# Patient Record
Sex: MALE | Race: WHITE | URBAN - METROPOLITAN AREA
[De-identification: names, ages, dates, MRNs, and addresses within clinical notes are randomized per-mention and may not be internally consistent; named-entity substitution may affect disease eponyms.]

---
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
## 2018-06-19 ENCOUNTER — OFFICE VISIT (OUTPATIENT)
Dept: FAMILY MEDICINE | Facility: CLINIC | Age: 18
End: 2018-06-19
Payer: COMMERCIAL

## 2018-06-19 VITALS — SYSTOLIC BLOOD PRESSURE: 124 MMHG | TEMPERATURE: 98.6 F | DIASTOLIC BLOOD PRESSURE: 72 MMHG

## 2018-06-19 DIAGNOSIS — Z71.84 TRAVEL ADVICE ENCOUNTER: Primary | ICD-10-CM

## 2018-06-19 PROCEDURE — 99402 PREV MED CNSL INDIV APPRX 30: CPT | Mod: 25 | Performed by: FAMILY MEDICINE

## 2018-06-19 PROCEDURE — 90691 TYPHOID VACCINE IM: CPT | Performed by: FAMILY MEDICINE

## 2018-06-19 PROCEDURE — 90471 IMMUNIZATION ADMIN: CPT | Performed by: FAMILY MEDICINE

## 2018-06-19 PROCEDURE — 90472 IMMUNIZATION ADMIN EACH ADD: CPT | Performed by: FAMILY MEDICINE

## 2018-06-19 PROCEDURE — 90632 HEPA VACCINE ADULT IM: CPT | Performed by: FAMILY MEDICINE

## 2018-06-19 RX ORDER — AZITHROMYCIN 500 MG/1
TABLET, FILM COATED ORAL
Qty: 3 TABLET | Refills: 0 | Status: SHIPPED | OUTPATIENT
Start: 2018-06-19

## 2018-06-19 NOTE — PROGRESS NOTES
Itinerary:  [place]    Departure Date: 07/24/2018    Return Date: 09/05/2018    Length of Trip: 6 weeks    Purpose of Trip: Training    Urban/Rural: Urban    Accommodations: Dorms    IMMUNIZATION HISTORY  Have you received any immunizations within the past 4 weeks?  No  Have you ever fainted from having your blood drawn or from an injection?  No  Have you ever had a fever reaction to vaccination?  No  Have you ever had any bad reaction or side effect from any vaccination?  No  Have you ever had hepatitis A or B vaccine?  Yes  Do you live (or work closely) with anyone who has AIDS, an AIDS-like condition, any other immune disorder or who is on chemotherapy for cancer or a   family history of immunodeficiency?  No  Have you received any injection of immune globulin or any blood products during the past 12 months?  No    Patient roomed by Ondina Andrade MA    Special medical concerns: none    /72  Temp 98.6  F (37  C) (Oral)  EXAM: deferred    Immunizations discussed include: Hepatitis A and Typhoid  Malaraia prophylaxis recommended: none, will not be in high risk areas, mosquito precautions only    Symptomatic treatment for traveler's diarrhea: azithromycin    ASSESSMENT/PLAN:    ICD-10-CM    1. Travel advice encounter Z71.89 azithromycin (ZITHROMAX) 500 MG tablet     HEPA VACCINE ADULT IM     TYPHOID VACCINE, IM     ADMIN 1st VACCINE     IMMUNIATION ADMIN EACH ADDT'     I have reviewed general recommendations for safe travel   including: food/water precautions, insect avoidance, safe sex   practices given high prevalence of HIV and other STDs,   roadway safety. Educational materials and [employer] report provided.    Total visit time 30 minutes with over 50% of time spent counseling patient.

## 2018-06-19 NOTE — MR AVS SNAPSHOT
After Visit Summary   6/19/2018    Forest Valadez    MRN: [id number]           Patient Information     Date Of Birth          2000        Visit Information        Provider Department      6/19/2018 3:00 PM Robby Joseph MD [facility]        Today's Diagnoses     Travel advice encounter    -  1       Follow-ups after your visit        Who to contact     If you have questions or need follow up information about today's clinic visit or your schedule please contact [facility] directly at [phone].  Normal or non-critical lab and imaging results will be communicated to you by iChangehart, letter or phone within 4 business days after the clinic has received the results. If you do not hear from us within 7 days, please contact the clinic through thereNowt or phone. If you have a critical or abnormal lab result, we will notify you by phone as soon as possible.  Submit refill requests through Opencare or call your pharmacy and they will forward the refill request to us. Please allow 3 business days for your refill to be completed.          Additional Information About Your Visit        MyChart Information     [employer] gives you secure access to your electronic health record. If you see a primary care provider, you can also send messages to your care team and make appointments. If you have questions, please call your primary care clinic.  If you do not have a primary care provider, please call [phone] and they will assist you.        Care EveryWhere ID     This is your Care EveryWhere ID. This could be used by other organizations to access your [place] medical records  [id number]        Your Vitals Were     Temperature                   98.6  F (37  C) (Oral)            Blood Pressure from Last 3 Encounters:   06/19/18 124/72   06/25/15 110/62   07/18/13 104/58    Weight from Last 3 Encounters:   06/25/15 136 lb 6.4 oz (61.9 kg) (40 %)*   07/18/13 127 lb (57.6 kg) (55  %)*     * Growth percentiles are based on [facility] 2-20 Years data.              We Performed the Following     ADMIN 1st VACCINE     HEPA VACCINE ADULT IM     IMMUNIATION ADMIN EACH ADDT'     TYPHOID VACCINE, IM          Today's Medication Changes          These changes are accurate as of 6/19/18 11:59 PM.  If you have any questions, ask your nurse or doctor.               Start taking these medicines.        Dose/Directions    azithromycin 500 MG tablet   Commonly known as:  ZITHROMAX   Used for:  Travel advice encounter   Started by:  Robby Joseph MD        Take one tablet daily for up to 3 days as needed for traveler's diarrhea   Quantity:  3 tablet   Refills:  0            Where to get your medicines      These medications were sent to [id number] [address] [facility] [facility]  [address] [facility] [address]    Hours:  Closed 10-16-08 business to [facility] Phone:  [phone]     azithromycin 500 MG tablet                Primary Care Provider Office Phone # Fax #    [facility] [phone] [address] [place] [address]        Equal Access to Services     [facility] AH: Hadii aad ku hadasho Soomaali, waaxda luqadaha, qaybta kaalmada adeegyada, nicol gao . So [facility] [phone].    ATENCIÓN: Si habla español, tiene a haque disposición servicios gratuitos de asistencia lingüística. Llame al [phone].    We comply with applicable federal civil rights laws and [place] laws. We do not discriminate on the basis of race, color, national origin, age, disability, sex, sexual orientation, or gender identity.            Thank you!     Thank you for choosing [facility]  for your care. Our goal is always to provide you with excellent care. Hearing back from our patients is one way we can continue to improve our services. Please take a few minutes to complete the written survey that you may  receive in the mail after your visit with us. Thank you!             Your Updated Medication List - Protect others around you: Learn how to safely use, store and throw away your medicines at www.disposemymeds.org.          This list is accurate as of 6/19/18 11:59 PM.  Always use your most recent med list.                   Brand Name Dispense Instructions for use Diagnosis    azithromycin 500 MG tablet    ZITHROMAX    3 tablet    Take one tablet daily for up to 3 days as needed for traveler's diarrhea    Travel advice encounter       tretinoin 0.05 % cream    RETIN-A    30 g    Apply once daily at bedtime for acne    Acne

## 2019-09-30 ENCOUNTER — TELEPHONE (OUTPATIENT)
Dept: PSYCHOLOGY | Facility: CLINIC | Age: 19
End: 2019-09-30

## 2020-03-02 ENCOUNTER — HEALTH MAINTENANCE LETTER (OUTPATIENT)
Age: 20
End: 2020-03-02

## 2020-08-05 ENCOUNTER — VIRTUAL VISIT (OUTPATIENT)
Dept: UROLOGY | Facility: CLINIC | Age: 20
End: 2020-08-05
Payer: COMMERCIAL

## 2020-08-05 DIAGNOSIS — I86.1 VARICOCELE: Primary | ICD-10-CM

## 2020-08-05 PROCEDURE — 99201 ZZC OFFICE/OUTPT VISIT, NEW, LEVEL I: CPT | Mod: 95 | Performed by: UROLOGY

## 2020-08-05 NOTE — PROGRESS NOTES
Telephone visit    20-year-old male with a history of a left varicocele.  He underwent surgery at age 11 for this varicocele.  Medical records for that intervention are not available.  The patient does not remember any details.    He is concerned that the varicocele is returning.  He has noted some prominence of vessels in the left hemiscrotum.    He denies any significant discomfort.  He has read that this may have some impact on fertility.  He is not currently trying to impregnate anyone.    I suggested we obtain an ultrasound of the scrotum to get a better sense of what degree of varicocele formation there is.    I did discuss with him the the lack of significant clinical problems produced by varicoceles.  Surgery for preservation of fertility would probably be reserved for significant development of testicular atrophy or failed attempt at impregnation.    I ordered the ultrasound and will get back to the patient when the results of the scrotal ultrasound are available.     Total time 10 minutes

## 2020-08-12 ENCOUNTER — HOSPITAL ENCOUNTER (OUTPATIENT)
Dept: ULTRASOUND IMAGING | Facility: CLINIC | Age: 20
Discharge: HOME | End: 2020-08-12
Attending: UROLOGY | Admitting: UROLOGY
Payer: COMMERCIAL

## 2020-08-12 DIAGNOSIS — I86.1 VARICOCELE: ICD-10-CM

## 2020-08-12 PROCEDURE — 93976 VASCULAR STUDY: CPT

## 2020-12-20 ENCOUNTER — HEALTH MAINTENANCE LETTER (OUTPATIENT)
Age: 20
End: 2020-12-20

## 2021-04-18 ENCOUNTER — HEALTH MAINTENANCE LETTER (OUTPATIENT)
Age: 21
End: 2021-04-18

## 2021-08-17 ENCOUNTER — FCC EXTENDED DOCUMENTATION (OUTPATIENT)
Dept: PSYCHOLOGY | Facility: CLINIC | Age: 21
End: 2021-08-17

## 2021-08-17 NOTE — PROGRESS NOTES
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  WHODAS 2.0 (12 item) Score:            Presenting Concern:  \"He was a patient before and decided to come back\".      Reason for Discharge:  Client did not return      Disposition at Time of Last Encounter:   Comments:         Risk Management:   Client denies a history of suicidal ideation, suicide attempts, self-injurious behavior, homicidal ideation, homicidal behavior and and other safety concerns  Recommended that patient call 911 or go to the local ED should there be a change in any of these risk factors.      Referred To:  May return for new episode of care.        Valdez Burns, [facility]   8/17/2021  "

## 2021-10-03 ENCOUNTER — HEALTH MAINTENANCE LETTER (OUTPATIENT)
Age: 21
End: 2021-10-03

## 2022-05-15 ENCOUNTER — HEALTH MAINTENANCE LETTER (OUTPATIENT)
Age: 22
End: 2022-05-15

## 2022-09-04 ENCOUNTER — HEALTH MAINTENANCE LETTER (OUTPATIENT)
Age: 22
End: 2022-09-04

## 2023-06-03 ENCOUNTER — HEALTH MAINTENANCE LETTER (OUTPATIENT)
Age: 23
End: 2023-06-03